# Patient Record
Sex: FEMALE | Race: WHITE | NOT HISPANIC OR LATINO | Employment: OTHER | URBAN - NONMETROPOLITAN AREA
[De-identification: names, ages, dates, MRNs, and addresses within clinical notes are randomized per-mention and may not be internally consistent; named-entity substitution may affect disease eponyms.]

---

## 2020-07-06 ENCOUNTER — HISTORICAL (OUTPATIENT)
Dept: ADMINISTRATIVE | Facility: HOSPITAL | Age: 80
End: 2020-07-06

## 2021-01-18 ENCOUNTER — APPOINTMENT (OUTPATIENT)
Dept: LAB | Facility: HOSPITAL | Age: 81
End: 2021-01-18
Attending: INTERNAL MEDICINE
Payer: MEDICARE

## 2021-01-18 DIAGNOSIS — Z20.822 2019 NOVEL CORONAVIRUS NOT DETECTED: ICD-10-CM

## 2021-01-18 LAB — SARS-COV-2 RNA RESP QL NAA+PROBE: NOT DETECTED

## 2021-01-18 PROCEDURE — U0002 COVID-19 LAB TEST NON-CDC: HCPCS

## 2021-05-20 PROBLEM — E11.9 DIABETES MELLITUS, TYPE 2: Status: ACTIVE | Noted: 2021-05-20

## 2021-05-20 PROBLEM — I10 HYPERTENSION: Status: ACTIVE | Noted: 2021-05-20

## 2021-05-20 PROBLEM — Z72.0 TOBACCO USE: Status: ACTIVE | Noted: 2021-05-20

## 2021-05-20 PROBLEM — K21.9 GERD (GASTROESOPHAGEAL REFLUX DISEASE): Status: ACTIVE | Noted: 2021-05-20

## 2021-05-20 PROBLEM — E03.9 HYPOTHYROIDISM: Status: ACTIVE | Noted: 2021-05-20

## 2021-05-20 PROBLEM — G47.00 INSOMNIA: Status: ACTIVE | Noted: 2021-05-20

## 2021-05-20 PROBLEM — F41.1 GENERALIZED ANXIETY DISORDER: Status: ACTIVE | Noted: 2021-05-20

## 2021-05-20 PROBLEM — E78.5 HYPERLIPIDEMIA: Status: ACTIVE | Noted: 2021-05-20

## 2021-05-21 PROBLEM — D64.9 ANEMIA: Status: ACTIVE | Noted: 2021-05-21

## 2021-05-27 PROBLEM — R30.0 DYSURIA: Status: ACTIVE | Noted: 2021-05-27

## 2021-06-24 ENCOUNTER — HOSPITAL ENCOUNTER (EMERGENCY)
Facility: HOSPITAL | Age: 81
Discharge: HOME | End: 2021-06-24
Attending: EMERGENCY MEDICINE
Payer: MEDICARE

## 2021-06-24 VITALS
OXYGEN SATURATION: 100 % | SYSTOLIC BLOOD PRESSURE: 164 MMHG | TEMPERATURE: 98 F | RESPIRATION RATE: 18 BRPM | HEART RATE: 84 BPM | DIASTOLIC BLOOD PRESSURE: 70 MMHG

## 2021-06-24 DIAGNOSIS — S91.311A LACERATION OF RIGHT FOOT, INITIAL ENCOUNTER: Primary | ICD-10-CM

## 2021-06-24 PROCEDURE — 90471 IMMUNIZATION ADMIN: CPT | Performed by: EMERGENCY MEDICINE

## 2021-06-24 PROCEDURE — 25000003 PHARM REV CODE 250: Performed by: EMERGENCY MEDICINE

## 2021-06-24 PROCEDURE — 63600175 PHARM REV CODE 636 W HCPCS: Performed by: EMERGENCY MEDICINE

## 2021-06-24 PROCEDURE — 99284 EMERGENCY DEPT VISIT MOD MDM: CPT | Mod: 25

## 2021-06-24 PROCEDURE — 12002 RPR S/N/AX/GEN/TRNK2.6-7.5CM: CPT

## 2021-06-24 PROCEDURE — 90715 TDAP VACCINE 7 YRS/> IM: CPT | Performed by: EMERGENCY MEDICINE

## 2021-06-24 RX ORDER — LIDOCAINE HYDROCHLORIDE 10 MG/ML
10 INJECTION INFILTRATION; PERINEURAL
Status: COMPLETED | OUTPATIENT
Start: 2021-06-24 | End: 2021-06-24

## 2021-06-24 RX ADMIN — TETANUS TOXOID, REDUCED DIPHTHERIA TOXOID AND ACELLULAR PERTUSSIS VACCINE, ADSORBED 0.5 ML: 5; 2.5; 8; 8; 2.5 SUSPENSION INTRAMUSCULAR at 09:06

## 2021-06-24 RX ADMIN — BACITRACIN, NEOMYCIN, POLYMYXIN B 1 EACH: 400; 3.5; 5 OINTMENT TOPICAL at 08:06

## 2021-06-24 RX ADMIN — LIDOCAINE HYDROCHLORIDE 10 ML: 10 INJECTION, SOLUTION INFILTRATION; PERINEURAL at 07:06

## 2021-07-06 PROBLEM — Z48.02 VISIT FOR SUTURE REMOVAL: Status: ACTIVE | Noted: 2021-07-06

## 2021-07-06 PROBLEM — B37.9 YEAST INFECTION: Status: ACTIVE | Noted: 2021-07-06

## 2021-12-29 PROBLEM — Z00.00 WELLNESS EXAMINATION: Status: ACTIVE | Noted: 2021-12-29

## 2021-12-29 PROBLEM — J30.9 ALLERGIC RHINITIS: Status: ACTIVE | Noted: 2021-12-29

## 2022-04-04 PROBLEM — Z00.00 WELLNESS EXAMINATION: Status: RESOLVED | Noted: 2021-12-29 | Resolved: 2022-04-04

## 2022-04-21 PROBLEM — J06.9 URI (UPPER RESPIRATORY INFECTION): Status: ACTIVE | Noted: 2022-04-21

## 2022-05-31 PROBLEM — R80.9 MICROALBUMINURIA: Status: ACTIVE | Noted: 2022-05-31

## 2022-05-31 PROBLEM — R91.8 ABNORMAL CT LUNG SCREENING: Status: ACTIVE | Noted: 2022-05-31

## 2022-05-31 PROBLEM — E63.0 ESSENTIAL FATTY ACID (EFA) DEFICIENCY: Status: ACTIVE | Noted: 2022-05-31

## 2022-05-31 PROBLEM — Z00.00 ROUTINE GENERAL MEDICAL EXAMINATION AT A HEALTH CARE FACILITY: Status: ACTIVE | Noted: 2022-05-31

## 2022-09-05 PROBLEM — Z00.00 ROUTINE GENERAL MEDICAL EXAMINATION AT A HEALTH CARE FACILITY: Status: RESOLVED | Noted: 2022-05-31 | Resolved: 2022-09-05

## 2022-10-14 PROBLEM — Z01.818 PREOP EXAM FOR INTERNAL MEDICINE: Status: ACTIVE | Noted: 2022-10-14

## 2022-10-20 NOTE — DISCHARGE INSTRUCTIONS
BEFORE THE PROCEDURE:    REPORT ANY CHANGE IN YOUR PHYSICAL CONDITION TO YOUR DOCTOR IMMEDIATELY.  SELF ISOLATE AND CHECK TEMPERATURE DAILY, IF TEMP OVER 100, CALL PHYSICIAN IMMEDIATELY.   NO SMOKING OR ALCOHOL FOR 72 HOURS BEFORE YOUR PROCEDURE.   DO NOT EAT OR DRINK ANYTHING AFTER MIDNIGHT THE NIGHT BEFORE YOUR PROCEDURE    THE MORNING OF YOUR PROCEDURE:      YOU  WILL GET A PHONE CALL THE DAY BEFORE YOUR PROCEDURE WITH YOUR APPOINTED TIME   NO MAKE UP OR NAIL POLISH.   ALL MINORS MUST BE ACCOMPANIED BY AN ADULT AT ALL TIMES.     TAKE A SHOWER/BATH THE NIGHT BEFORE YOUR PROCEDURE.     TAKE BLOOD PRESSURE MEDICATIONS THE MORNING OF YOUR PROCEDURE, WITH SMALL SIPS WATER, AS DIRECTED BY YOUR PHYSICIAN.   DO NOT TAKE ANY DIABETIC MEDICATIONS UNLESS DIRECTED TO DO SO BY YOUR PHYSICIAN.   CONTACT LENSES AND DENTURES MUST BE REMOVED.  A RESPONSIBLE ADULT MUST ACCOMPANY YOU HOME UPON DISCHARGE.   ONLY 1 VISITORS ALLOWED PER ROOM.   BRING YOUR EYE DROPS      COVID 19: RETURN TO FIRST FLOOR REGISTRATION ON Monday October 24, 2022      YOUR THOUGHTS AND OPINIONS HELP US TO BETTER SERVE YOU.     PLEASE PARTICIPATE IN SURVEYS ABOUT YOUR CARE.     THANK YOU FOR CHOOSING [facility].

## 2022-10-21 ENCOUNTER — ANESTHESIA EVENT (OUTPATIENT)
Dept: SURGERY | Facility: HOSPITAL | Age: 82
End: 2022-10-21
Payer: MEDICARE

## 2022-10-21 VITALS — HEIGHT: 65 IN | BODY MASS INDEX: 22.33 KG/M2 | WEIGHT: 134 LBS

## 2022-10-21 NOTE — ANESTHESIA PREPROCEDURE EVALUATION
10/21/2022  Karen Ann Mulvihill is a 82 y.o., female.      Pre-op Assessment    I have reviewed the Patient Summary Reports.    I have reviewed the NPO Status.   I have reviewed the Medications.     Review of Systems  Anesthesia Hx:  No problems with previous Anesthesia  Denies Family Hx of Anesthesia complications.   Denies Personal Hx of Anesthesia complications.   Social:  Smoker    Cardiovascular:   Hypertension, well controlled Past MI CAD asymptomatic CABG/stent     Pulmonary:  Pulmonary Normal    Renal/:  Renal/ Normal     Hepatic/GI:   GERD, well controlled    Neurological:  Neurology Normal    Endocrine:   Diabetes Hypothyroidism    Psych:   anxiety          Physical Exam  General: Well nourished    Airway:  Mallampati: II / II  Mouth Opening: Normal  TM Distance: Normal  Tongue: Normal  Neck ROM: Normal ROM    Dental:  Dentures    Chest/Lungs:  Clear to auscultation    Heart:  Rate: Normal  Rhythm: Regular Rhythm  Sounds: Normal        Anesthesia Plan  Type of Anesthesia, risks & benefits discussed:    Anesthesia Type: MAC  Intra-op Monitoring Plan: Standard ASA Monitors  Post Op Pain Control Plan: multimodal analgesia  Induction:  IV  Airway Plan: Direct  Informed Consent: Informed consent signed with the Patient and all parties understand the risks and agree with anesthesia plan.  All questions answered.   ASA Score: 3  Day of Surgery Review of History & Physical: I have interviewed and examined the patient. I have reviewed the patient's H&P dated: There are no significant changes.     Ready For Surgery From Anesthesia Perspective.     .

## 2022-10-23 PROBLEM — H25.12 AGE-RELATED NUCLEAR CATARACT, LEFT EYE: Status: ACTIVE | Noted: 2022-10-23

## 2022-10-23 PROBLEM — H25.012 CORTICAL AGE-RELATED CATARACT, LEFT EYE: Status: ACTIVE | Noted: 2022-10-23

## 2022-10-24 ENCOUNTER — HOSPITAL ENCOUNTER (OUTPATIENT)
Dept: PREADMISSION TESTING | Facility: HOSPITAL | Age: 82
Discharge: HOME | End: 2022-10-24
Attending: OPHTHALMOLOGY
Payer: MEDICARE

## 2022-10-24 DIAGNOSIS — Z01.818 PRE-OP TESTING: ICD-10-CM

## 2022-10-24 LAB — SARS-COV-2 RNA RESP QL NAA+PROBE: NOT DETECTED

## 2022-10-24 PROCEDURE — U0002 COVID-19 LAB TEST NON-CDC: HCPCS | Performed by: OPHTHALMOLOGY

## 2022-10-25 ENCOUNTER — ANESTHESIA (OUTPATIENT)
Dept: SURGERY | Facility: HOSPITAL | Age: 82
End: 2022-10-25
Payer: MEDICARE

## 2022-10-25 ENCOUNTER — HOSPITAL ENCOUNTER (OUTPATIENT)
Facility: HOSPITAL | Age: 82
Discharge: HOME | End: 2022-10-25
Attending: OPHTHALMOLOGY | Admitting: OPHTHALMOLOGY
Payer: MEDICARE

## 2022-10-25 VITALS
HEART RATE: 68 BPM | OXYGEN SATURATION: 94 % | RESPIRATION RATE: 18 BRPM | TEMPERATURE: 98 F | SYSTOLIC BLOOD PRESSURE: 121 MMHG | DIASTOLIC BLOOD PRESSURE: 75 MMHG

## 2022-10-25 DIAGNOSIS — H25.12 AGE-RELATED NUCLEAR CATARACT, LEFT EYE: ICD-10-CM

## 2022-10-25 PROBLEM — H25.012 CORTICAL AGE-RELATED CATARACT, LEFT EYE: Status: RESOLVED | Noted: 2022-10-23 | Resolved: 2022-10-25

## 2022-10-25 LAB — POCT GLUCOSE: 153 MG/DL (ref 70–110)

## 2022-10-25 PROCEDURE — 25000242 PHARM REV CODE 250 ALT 637 W/ HCPCS: Performed by: ANESTHESIOLOGY

## 2022-10-25 PROCEDURE — 36000707: Performed by: OPHTHALMOLOGY

## 2022-10-25 PROCEDURE — 37000009 HC ANESTHESIA EA ADD 15 MINS: Performed by: OPHTHALMOLOGY

## 2022-10-25 PROCEDURE — 25000003 PHARM REV CODE 250: Performed by: OPHTHALMOLOGY

## 2022-10-25 PROCEDURE — 36000706: Performed by: OPHTHALMOLOGY

## 2022-10-25 PROCEDURE — 63600175 PHARM REV CODE 636 W HCPCS: Performed by: OPHTHALMOLOGY

## 2022-10-25 PROCEDURE — 71000015 HC POSTOP RECOV 1ST HR: Performed by: OPHTHALMOLOGY

## 2022-10-25 PROCEDURE — V2632 POST CHMBR INTRAOCULAR LENS: HCPCS | Performed by: OPHTHALMOLOGY

## 2022-10-25 PROCEDURE — 37000008 HC ANESTHESIA 1ST 15 MINUTES: Performed by: OPHTHALMOLOGY

## 2022-10-25 DEVICE — IMPLANTABLE DEVICE: Type: IMPLANTABLE DEVICE | Site: EYE | Status: FUNCTIONAL

## 2022-10-25 RX ORDER — LIDOCAINE HYDROCHLORIDE 10 MG/ML
0.5 INJECTION, SOLUTION EPIDURAL; INFILTRATION; INTRACAUDAL; PERINEURAL ONCE
Status: ACTIVE | OUTPATIENT
Start: 2022-10-25

## 2022-10-25 RX ORDER — PHENYLEPHRINE HYDROCHLORIDE 25 MG/ML
1 SOLUTION/ DROPS OPHTHALMIC
Status: COMPLETED | OUTPATIENT
Start: 2022-10-25 | End: 2022-10-25

## 2022-10-25 RX ORDER — BROMFENAC SODIUM 0.81 MG/ML
SOLUTION/ DROPS OPHTHALMIC
Status: DISCONTINUED | OUTPATIENT
Start: 2022-10-25 | End: 2022-10-25 | Stop reason: HOSPADM

## 2022-10-25 RX ORDER — LIDOCAINE HYDROCHLORIDE 10 MG/ML
1 INJECTION, SOLUTION EPIDURAL; INFILTRATION; INTRACAUDAL; PERINEURAL ONCE
Status: ACTIVE | OUTPATIENT
Start: 2022-10-25

## 2022-10-25 RX ORDER — MIDAZOLAM HCL 2 MG/ML
4 SYRUP ORAL ONCE
Status: COMPLETED | OUTPATIENT
Start: 2022-10-25 | End: 2022-10-25

## 2022-10-25 RX ORDER — PROPARACAINE HYDROCHLORIDE 5 MG/ML
1 SOLUTION/ DROPS OPHTHALMIC
Status: COMPLETED | OUTPATIENT
Start: 2022-10-25 | End: 2022-10-25

## 2022-10-25 RX ORDER — MOXIFLOXACIN 5 MG/ML
SOLUTION/ DROPS OPHTHALMIC
Status: DISCONTINUED | OUTPATIENT
Start: 2022-10-25 | End: 2022-10-25 | Stop reason: HOSPADM

## 2022-10-25 RX ORDER — TROPICAMIDE 10 MG/ML
1 SOLUTION/ DROPS OPHTHALMIC
Status: COMPLETED | OUTPATIENT
Start: 2022-10-25 | End: 2022-10-25

## 2022-10-25 RX ORDER — MOXIFLOXACIN 5 MG/ML
1 SOLUTION/ DROPS OPHTHALMIC
Status: COMPLETED | OUTPATIENT
Start: 2022-10-25 | End: 2022-10-25

## 2022-10-25 RX ORDER — LIDOCAINE HYDROCHLORIDE 10 MG/ML
INJECTION, SOLUTION EPIDURAL; INFILTRATION; INTRACAUDAL; PERINEURAL
Status: DISCONTINUED | OUTPATIENT
Start: 2022-10-25 | End: 2022-10-25 | Stop reason: HOSPADM

## 2022-10-25 RX ORDER — PROPARACAINE HYDROCHLORIDE 5 MG/ML
SOLUTION/ DROPS OPHTHALMIC
Status: DISCONTINUED | OUTPATIENT
Start: 2022-10-25 | End: 2022-10-25 | Stop reason: HOSPADM

## 2022-10-25 RX ORDER — PREDNISOLONE ACETATE 10 MG/ML
SUSPENSION/ DROPS OPHTHALMIC
Status: DISCONTINUED | OUTPATIENT
Start: 2022-10-25 | End: 2022-10-25 | Stop reason: HOSPADM

## 2022-10-25 RX ADMIN — MIDAZOLAM HYDROCHLORIDE 4 MG: 2 SYRUP ORAL at 01:10

## 2022-10-25 RX ADMIN — TROPICAMIDE 1 DROP: 10 SOLUTION/ DROPS OPHTHALMIC at 10:10

## 2022-10-25 RX ADMIN — MOXIFLOXACIN 1 DROP: 5 SOLUTION/ DROPS OPHTHALMIC at 11:10

## 2022-10-25 RX ADMIN — PHENYLEPHRINE HYDROCHLORIDE 1 DROP: 25 SOLUTION/ DROPS OPHTHALMIC at 10:10

## 2022-10-25 RX ADMIN — PROPARACAINE HYDROCHLORIDE 1 DROP: 5 SOLUTION/ DROPS OPHTHALMIC at 10:10

## 2022-10-25 RX ADMIN — MOXIFLOXACIN 1 DROP: 5 SOLUTION/ DROPS OPHTHALMIC at 10:10

## 2022-10-25 RX ADMIN — PHENYLEPHRINE HYDROCHLORIDE 1 DROP: 25 SOLUTION/ DROPS OPHTHALMIC at 11:10

## 2022-10-25 RX ADMIN — PROPARACAINE HYDROCHLORIDE 1 DROP: 5 SOLUTION/ DROPS OPHTHALMIC at 11:10

## 2022-10-25 RX ADMIN — TROPICAMIDE 1 DROP: 10 SOLUTION/ DROPS OPHTHALMIC at 11:10

## 2022-10-25 NOTE — ANESTHESIA POSTPROCEDURE EVALUATION
Anesthesia Post Evaluation    Patient: Karen Ann Mulvihill    Procedure(s) Performed: Procedure(s) (LRB):  PHACOEMULSIFICATION, CATARACT, WITH IOL INSERTION (Left)    Final Anesthesia Type: MAC      Patient location during evaluation: OPS  Patient participation: Yes- Able to Participate  Level of consciousness: awake and alert  Post-procedure vital signs: reviewed and stable  Pain management: adequate  Airway patency: patent      Anesthetic complications: no      Cardiovascular status: stable  Respiratory status: unassisted  Hydration status: euvolemic  Follow-up not needed.          Vitals Value Taken Time   /75 10/25/22 1410   Temp 36.7 °C (98 °F) 10/25/22 1410   Pulse 68 10/25/22 1410   Resp 18 10/25/22 1410   SpO2 94 % 10/25/22 1410         No case tracking events are documented in the log.      Pain/Hussein Score: Hussein Score: 10 (10/25/2022  2:10 PM)

## 2022-10-25 NOTE — TRANSFER OF CARE
Anesthesia Transfer of Care Note    Patient: Karen Ann Mulvihill    Procedure(s) Performed: Procedure(s) (LRB):  PHACOEMULSIFICATION, CATARACT, WITH IOL INSERTION (Left)    Patient location: OPS    Anesthesia Type: MAC    Transport from OR: Transported from OR on room air with adequate spontaneous ventilation    Post pain: adequate analgesia    Post assessment: no apparent anesthetic complications    Post vital signs: stable    Level of consciousness: awake    Nausea/Vomiting: no nausea/vomiting    Complications: none          Last vitals:   Visit Vitals  BP (!) 128/59 (Patient Position: Sitting)   Pulse 71   Temp 36.6 °C (97.9 °F) (Oral)   Resp 16   SpO2 95%

## 2022-10-25 NOTE — DISCHARGE SUMMARY
[facility]  Brief Discharge Note    Patient Name:  Karen Ann Mulvihill   MRN:  [id number]    :  1940   Admission Date:  10/25/2022   Discharge Date:  10/25/2022   Attending Physician: Herbert Redding MD     Procedure:  PHACOEMULSIFICATION, CATARACT (Left)    OUTCOME: Patient tolerated procedure well without complication and is now ready for discharge.    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                                                       Only remove eye shield to apply eye drops.                                                       Follow the post-op eye instructions given from the clinic.

## 2022-10-25 NOTE — PLAN OF CARE
Plan of care note reviewed with patient, patient and caregiver verbalize understanding and agree.

## 2022-10-25 NOTE — DISCHARGE INSTRUCTIONS
-RELAX AT HOME FOR THE REST OF THE DAY. YOU MAY EAT ANYTHING YOU LIKE.   -PLAN TO SEE DR WILSON TOMORROW AT THE OFFICE. BRING ALL EYE DROPS WITH YOU TO THIS APPOINTMENT.    -PUT EYE DROPS IN THE AFFECTED EYE AS PER DR WILSON'S EYE DROP SCHEDULED. -USE IN ANY ORDER, WAIT 5 MINUTES BETWEEN DROPS.  -REMOVE EYE SHIELD WHILE PUTTING EYE DROPS IN.    -DO NOT RUB YOUR EYE!!  -DO NOT EXERT YOURSELF - NO HEAVY LIFTING, RUNNING OR SWIMMING FOR 1 WEEK.  -YOU MAY SHOWER, BUT DON'T ALLOW WATER INTO YOUR EYE FOR 1 WEEK.  -WEAR PROTECTIVE SUNGLASSES DURING THE DAY AND AN EYE SHIELD AT NIGHT FOR 1 WEEK.    NO DRINKING ALCOHOL OR DRIVING FOR 24 HOURS.    -IF YOU HAVE PAIN, REDNESS OR DECREASED VISION, CALL DR WILSON'S OFFICE [phone] OR IF AFTER HOURS CALL [phone].

## 2022-10-25 NOTE — OP NOTE
[facility]  Operative Note    Date:  10/25/2022    Patient:  Karen Ann Mulvihill  MRN:  [id number]   :  1940    Surgeon:  Herbert Redding MD    PREOPERATIVE DIAGNOSIS:  Visually significant age-related nuclear and cortical cataract, left eye.    POSTOPERATIVE DIAGNOSIS:  Visually significant age-related nuclear and cortical cataract, left eye.    PROCEDURE:  Phacoemulsification of cataract with posterior chamber intraocular lens implant, left eye.    ANESTHESIA:  Topical with MAC.      COMPLICATIONS:  None.    ESTIMATED BLOOD LOSS:  Minimal.    PROCEDURE IN DETAIL:  The patient presented to our clinic complaining of increasing difficulties with activities of daily living due to a visually significant cataract in the left eye.  After risks, benefits, and alternatives were explained to the patient, the patient agreed to proceed with the above procedure.  The patient was brought to the operating room and received topical anesthetic to the left eye and was then prepped and draped in the usual sterile fashion.  The left eye was first entered at 5:00 o'clock paracentesis site followed by Trypan Blue, intracameral lidocaine, and Viscoat.  The primary surgical site was then created at 2:30 o'clock followed by continuous capsulotomy, hydrodissection, and phacoemulsification of the cataract.  Residual cortical material was removed using the automated irrigation-aspiration technique.  Provisc was injected into the posterior chamber and an Simone model SN60AT 31.0 diopter lens was placed in the bag without difficulty after enlarging the main incision to 3.0 mm.  Residual Provisc was removed using the automated irrigation-aspiration technique.  The eye was re-pressurized using BSS solution, and both the paracentesis and primary surgical site were demonstrated to be watertight at the end of the case with Weck-Geovanna manipulation.  Vigamox, Pred Forte, and Prolensa were placed in the eye followed by placement of a [place]  shield.  The patient tolerated the procedure well and was taken to the recovery room in good and stable condition.  The patient was instructed to refrain from any heavy lifting, bending, stooping, or straining activities and to follow-up in the morning for routine post-operative care with Dr. Herbert Redding.

## 2023-02-14 PROBLEM — Z00.00 ROUTINE GENERAL MEDICAL EXAMINATION AT A HEALTH CARE FACILITY: Status: ACTIVE | Noted: 2023-02-14

## 2023-02-14 PROBLEM — E88.89 COENZYME Q DEFICIENCY: Status: ACTIVE | Noted: 2023-02-14

## 2023-05-03 PROBLEM — E11.65 INADEQUATELY CONTROLLED DIABETES MELLITUS: Status: ACTIVE | Noted: 2023-05-03

## 2023-05-15 ENCOUNTER — HOSPITAL ENCOUNTER (OUTPATIENT)
Dept: RADIOLOGY | Facility: HOSPITAL | Age: 83
Discharge: HOME | End: 2023-05-15
Payer: MEDICARE

## 2023-05-15 DIAGNOSIS — S09.90XA HEAD TRAUMA, INITIAL ENCOUNTER: ICD-10-CM

## 2023-05-15 PROBLEM — Z00.00 WELLNESS EXAMINATION: Status: RESOLVED | Noted: 2021-12-29 | Resolved: 2023-05-15

## 2023-05-15 PROCEDURE — 70450 CT HEAD/BRAIN W/O DYE: CPT | Mod: TC

## 2023-05-22 PROBLEM — Z00.00 ROUTINE GENERAL MEDICAL EXAMINATION AT A HEALTH CARE FACILITY: Status: RESOLVED | Noted: 2023-02-14 | Resolved: 2023-05-22

## 2023-08-07 PROBLEM — R25.1 TREMOR: Status: ACTIVE | Noted: 2023-08-07

## 2023-09-12 PROBLEM — R91.1 SOLITARY PULMONARY NODULE: Status: ACTIVE | Noted: 2023-09-12

## 2023-11-06 PROBLEM — Z00.00 WELLNESS EXAMINATION: Status: RESOLVED | Noted: 2021-12-29 | Resolved: 2023-11-06

## 2024-01-30 ENCOUNTER — LAB VISIT (OUTPATIENT)
Dept: LAB | Facility: HOSPITAL | Age: 84
End: 2024-01-30
Payer: MEDICARE

## 2024-01-30 DIAGNOSIS — E87.1 HYPONATREMIA: ICD-10-CM

## 2024-01-30 DIAGNOSIS — E87.5 HYPERKALEMIA: ICD-10-CM

## 2024-01-30 PROBLEM — E78.5 HYPERLIPIDEMIA: Chronic | Status: ACTIVE | Noted: 2021-05-20

## 2024-01-30 LAB
ALBUMIN SERPL BCP-MCNC: 3.5 G/DL (ref 3.5–5.2)
ALP SERPL-CCNC: 69 U/L (ref 55–135)
ALT SERPL W/O P-5'-P-CCNC: 27 U/L (ref 10–44)
ANION GAP SERPL CALC-SCNC: 0 MMOL/L (ref 3–11)
AST SERPL-CCNC: 16 U/L (ref 10–40)
BILIRUB SERPL-MCNC: 0.3 MG/DL (ref 0.1–1)
BUN SERPL-MCNC: 20 MG/DL (ref 8–23)
CALCIUM SERPL-MCNC: 9.4 MG/DL (ref 8.7–10.5)
CHLORIDE SERPL-SCNC: 100 MMOL/L (ref 95–110)
CO2 SERPL-SCNC: 31 MMOL/L (ref 23–29)
CREAT SERPL-MCNC: 0.8 MG/DL (ref 0.5–1.4)
EST. GFR  (NO RACE VARIABLE): >60 ML/MIN/1.73 M^2
GLUCOSE SERPL-MCNC: 194 MG/DL (ref 70–110)
POTASSIUM SERPL-SCNC: 4.1 MMOL/L (ref 3.5–5.1)
PROT SERPL-MCNC: 7 G/DL (ref 6–8.4)
SODIUM SERPL-SCNC: 131 MMOL/L (ref 136–145)

## 2024-01-30 PROCEDURE — 36415 COLL VENOUS BLD VENIPUNCTURE: CPT

## 2024-01-30 PROCEDURE — 80053 COMPREHEN METABOLIC PANEL: CPT

## 2024-01-31 NOTE — PROGRESS NOTES
Please let patient know potassium levels are normal.  Sodium mildly decreased.  Ensure adequate po intake of fluids with electrolytes.

## 2024-04-15 PROBLEM — R60.9 EDEMA: Status: ACTIVE | Noted: 2024-04-15

## 2024-06-21 ENCOUNTER — LAB VISIT (OUTPATIENT)
Dept: LAB | Facility: HOSPITAL | Age: 84
End: 2024-06-21
Attending: INTERNAL MEDICINE
Payer: MEDICARE

## 2024-06-21 DIAGNOSIS — E55.9 VITAMIN D DEFICIENCY: ICD-10-CM

## 2024-06-21 DIAGNOSIS — E78.49 OTHER HYPERLIPIDEMIA: ICD-10-CM

## 2024-06-21 DIAGNOSIS — I10 PRIMARY HYPERTENSION: ICD-10-CM

## 2024-06-21 DIAGNOSIS — E11.59 TYPE 2 DIABETES MELLITUS WITH OTHER CIRCULATORY COMPLICATION, WITHOUT LONG-TERM CURRENT USE OF INSULIN: ICD-10-CM

## 2024-06-21 DIAGNOSIS — R41.9 UNSPECIFIED SYMPTOMS AND SIGNS INVOLVING COGNITIVE FUNCTIONS AND AWARENESS: ICD-10-CM

## 2024-06-21 DIAGNOSIS — R53.83 MALAISE AND FATIGUE: ICD-10-CM

## 2024-06-21 DIAGNOSIS — R53.81 MALAISE AND FATIGUE: ICD-10-CM

## 2024-06-21 DIAGNOSIS — Z00.00 WELLNESS EXAMINATION: ICD-10-CM

## 2024-06-21 LAB
ALBUMIN SERPL BCP-MCNC: 3.8 G/DL (ref 3.5–5.2)
ALBUMIN/CREAT UR: 122.1 UG/MG (ref 0–30)
ALP SERPL-CCNC: 82 U/L (ref 55–135)
ALT SERPL W/O P-5'-P-CCNC: 27 U/L (ref 10–44)
ANION GAP SERPL CALC-SCNC: 9 MMOL/L (ref 3–11)
AST SERPL-CCNC: 17 U/L (ref 10–40)
BASOPHILS # BLD AUTO: 0.02 K/UL (ref 0–0.2)
BASOPHILS NFR BLD: 0.3 % (ref 0–1.9)
BILIRUB SERPL-MCNC: 0.4 MG/DL (ref 0.1–1)
BUN SERPL-MCNC: 19 MG/DL (ref 8–23)
CALCIUM SERPL-MCNC: 10 MG/DL (ref 8.7–10.5)
CHLORIDE SERPL-SCNC: 98 MMOL/L (ref 95–110)
CHOLEST SERPL-MCNC: 149 MG/DL (ref 120–199)
CHOLEST/HDLC SERPL: 2.8 {RATIO} (ref 2–5)
CO2 SERPL-SCNC: 29 MMOL/L (ref 23–29)
CREAT SERPL-MCNC: 1 MG/DL (ref 0.5–1.4)
CREAT UR-MCNC: 70.2 MG/DL (ref 15–325)
DIFFERENTIAL METHOD BLD: NORMAL
EOSINOPHIL # BLD AUTO: 0.1 K/UL (ref 0–0.5)
EOSINOPHIL NFR BLD: 1.8 % (ref 0–8)
ERYTHROCYTE [DISTWIDTH] IN BLOOD BY AUTOMATED COUNT: 13.2 % (ref 11.5–14.5)
EST. GFR  (NO RACE VARIABLE): 56.6 ML/MIN/1.73 M^2
FOLATE SERPL-MCNC: 31 NG/ML (ref 4–24)
GLUCOSE SERPL-MCNC: 199 MG/DL (ref 70–110)
HCT VFR BLD AUTO: 42.9 % (ref 37–48.5)
HDLC SERPL-MCNC: 54 MG/DL (ref 40–75)
HDLC SERPL: 36.2 % (ref 20–50)
HGB BLD-MCNC: 14.8 G/DL (ref 12–16)
IMM GRANULOCYTES # BLD AUTO: 0.02 K/UL (ref 0–0.04)
IMM GRANULOCYTES NFR BLD AUTO: 0.3 % (ref 0–0.5)
LDLC SERPL CALC-MCNC: 57.4 MG/DL (ref 63–159)
LYMPHOCYTES # BLD AUTO: 2.1 K/UL (ref 1–4.8)
LYMPHOCYTES NFR BLD: 34.4 % (ref 18–48)
MCH RBC QN AUTO: 31 PG (ref 27–31)
MCHC RBC AUTO-ENTMCNC: 34.5 G/DL (ref 32–36)
MCV RBC AUTO: 90 FL (ref 82–98)
MICROALBUMIN UR DL<=1MG/L-MCNC: 85.7 MG/L
MONOCYTES # BLD AUTO: 0.7 K/UL (ref 0.3–1)
MONOCYTES NFR BLD: 10.6 % (ref 4–15)
NEUTROPHILS # BLD AUTO: 3.2 K/UL (ref 1.8–7.7)
NEUTROPHILS NFR BLD: 52.6 % (ref 38–73)
NONHDLC SERPL-MCNC: 95 MG/DL
NRBC BLD-RTO: 0 /100 WBC
PLATELET # BLD AUTO: 274 K/UL (ref 150–450)
PMV BLD AUTO: 10.4 FL (ref 9.2–12.9)
POTASSIUM SERPL-SCNC: 4.1 MMOL/L (ref 3.5–5.1)
PROT SERPL-MCNC: 7.7 G/DL (ref 6–8.4)
RBC # BLD AUTO: 4.78 M/UL (ref 4–5.4)
SODIUM SERPL-SCNC: 136 MMOL/L (ref 136–145)
T4 FREE SERPL-MCNC: 0.96 NG/DL (ref 0.71–1.51)
TRIGL SERPL-MCNC: 188 MG/DL (ref 30–150)
TSH SERPL DL<=0.005 MIU/L-ACNC: 4.06 UIU/ML (ref 0.4–4)
VIT B12 SERPL-MCNC: 1093 PG/ML (ref 210–950)
WBC # BLD AUTO: 6.14 K/UL (ref 3.9–12.7)

## 2024-06-21 PROCEDURE — 84443 ASSAY THYROID STIM HORMONE: CPT | Performed by: INTERNAL MEDICINE

## 2024-06-21 PROCEDURE — 82652 VIT D 1 25-DIHYDROXY: CPT | Performed by: INTERNAL MEDICINE

## 2024-06-21 PROCEDURE — 82746 ASSAY OF FOLIC ACID SERUM: CPT | Performed by: INTERNAL MEDICINE

## 2024-06-21 PROCEDURE — 82570 ASSAY OF URINE CREATININE: CPT | Performed by: INTERNAL MEDICINE

## 2024-06-21 PROCEDURE — 80061 LIPID PANEL: CPT | Performed by: INTERNAL MEDICINE

## 2024-06-21 PROCEDURE — 80053 COMPREHEN METABOLIC PANEL: CPT | Performed by: INTERNAL MEDICINE

## 2024-06-21 PROCEDURE — 84439 ASSAY OF FREE THYROXINE: CPT | Performed by: INTERNAL MEDICINE

## 2024-06-21 PROCEDURE — 85025 COMPLETE CBC W/AUTO DIFF WBC: CPT | Performed by: INTERNAL MEDICINE

## 2024-06-21 PROCEDURE — 82607 VITAMIN B-12: CPT | Performed by: INTERNAL MEDICINE

## 2024-06-21 PROCEDURE — 36415 COLL VENOUS BLD VENIPUNCTURE: CPT | Performed by: INTERNAL MEDICINE

## 2024-06-24 PROBLEM — M62.838 MUSCLE SPASMS OF NECK: Status: ACTIVE | Noted: 2024-06-24

## 2024-06-24 PROBLEM — G44.89 OTHER HEADACHE SYNDROME: Status: ACTIVE | Noted: 2024-06-24

## 2024-06-24 PROBLEM — J32.9 SINUSITIS: Status: ACTIVE | Noted: 2024-06-24

## 2024-06-24 LAB — 1,25(OH)2D3 SERPL-MCNC: 46 PG/ML (ref 20–79)

## 2024-10-10 ENCOUNTER — LAB VISIT (OUTPATIENT)
Dept: LAB | Facility: HOSPITAL | Age: 84
End: 2024-10-10
Payer: MEDICARE

## 2024-10-10 DIAGNOSIS — E11.59 TYPE 2 DIABETES MELLITUS WITH OTHER CIRCULATORY COMPLICATION, WITHOUT LONG-TERM CURRENT USE OF INSULIN: ICD-10-CM

## 2024-10-10 LAB
ALBUMIN SERPL BCP-MCNC: 3.8 G/DL (ref 3.5–5.2)
ALP SERPL-CCNC: 69 U/L (ref 55–135)
ALT SERPL W/O P-5'-P-CCNC: 22 U/L (ref 10–44)
ANION GAP SERPL CALC-SCNC: 6 MMOL/L (ref 3–11)
AST SERPL-CCNC: 11 U/L (ref 10–40)
BILIRUB SERPL-MCNC: 0.5 MG/DL (ref 0.1–1)
BUN SERPL-MCNC: 20 MG/DL (ref 8–23)
CALCIUM SERPL-MCNC: 9.7 MG/DL (ref 8.7–10.5)
CHLORIDE SERPL-SCNC: 100 MMOL/L (ref 95–110)
CO2 SERPL-SCNC: 28 MMOL/L (ref 23–29)
CREAT SERPL-MCNC: 0.9 MG/DL (ref 0.5–1.4)
EST. GFR  (NO RACE VARIABLE): >60 ML/MIN/1.73 M^2
GLUCOSE SERPL-MCNC: 155 MG/DL (ref 70–110)
POTASSIUM SERPL-SCNC: 4.4 MMOL/L (ref 3.5–5.1)
PROT SERPL-MCNC: 7.2 G/DL (ref 6–8.4)
SODIUM SERPL-SCNC: 134 MMOL/L (ref 136–145)

## 2024-10-10 PROCEDURE — 36415 COLL VENOUS BLD VENIPUNCTURE: CPT | Performed by: INTERNAL MEDICINE

## 2024-10-10 PROCEDURE — 80053 COMPREHEN METABOLIC PANEL: CPT | Performed by: INTERNAL MEDICINE

## (undated) DEVICE — SOL IRRI STRL WATER 1000ML

## (undated) DEVICE — ELECTRODE FOAM 535 TEARDROP

## (undated) DEVICE — SHEILD EYE PROTCT BLUE FLEX

## (undated) DEVICE — GLOVE BIOGEL ECLIPSE SZ 7.5

## (undated) DEVICE — UNDERGLOVES BIOGEL PI SIZE 7.5

## (undated) DEVICE — GAUZE SPONGE 4X4 12PLY

## (undated) DEVICE — GLOVE BIOGEL ECLIPSE SZ 6.5

## (undated) DEVICE — CANNULA SUPERSOFT CO2 7FT